# Patient Record
Sex: FEMALE | URBAN - METROPOLITAN AREA
[De-identification: names, ages, dates, MRNs, and addresses within clinical notes are randomized per-mention and may not be internally consistent; named-entity substitution may affect disease eponyms.]

---

## 2020-09-15 ENCOUNTER — APPOINTMENT (RX ONLY)
Dept: URBAN - METROPOLITAN AREA CLINIC 318 | Facility: CLINIC | Age: 35
Setting detail: DERMATOLOGY
End: 2020-09-15

## 2020-09-15 DIAGNOSIS — Z85.828 PERSONAL HISTORY OF OTHER MALIGNANT NEOPLASM OF SKIN: ICD-10-CM | Status: RESOLVED

## 2020-09-15 DIAGNOSIS — D22 MELANOCYTIC NEVI: ICD-10-CM

## 2020-09-15 PROBLEM — D22.39 MELANOCYTIC NEVI OF OTHER PARTS OF FACE: Status: ACTIVE | Noted: 2020-09-15

## 2020-09-15 PROBLEM — D22.5 MELANOCYTIC NEVI OF TRUNK: Status: ACTIVE | Noted: 2020-09-15

## 2020-09-15 PROBLEM — D22.72 MELANOCYTIC NEVI OF LEFT LOWER LIMB, INCLUDING HIP: Status: ACTIVE | Noted: 2020-09-15

## 2020-09-15 PROBLEM — D22.61 MELANOCYTIC NEVI OF RIGHT UPPER LIMB, INCLUDING SHOULDER: Status: ACTIVE | Noted: 2020-09-15

## 2020-09-15 PROBLEM — D22.71 MELANOCYTIC NEVI OF RIGHT LOWER LIMB, INCLUDING HIP: Status: ACTIVE | Noted: 2020-09-15

## 2020-09-15 PROBLEM — D22.62 MELANOCYTIC NEVI OF LEFT UPPER LIMB, INCLUDING SHOULDER: Status: ACTIVE | Noted: 2020-09-15

## 2020-09-15 PROCEDURE — ? COUNSELING

## 2020-09-15 PROCEDURE — ? ADDITIONAL NOTES

## 2020-09-15 PROCEDURE — 99203 OFFICE O/P NEW LOW 30 MIN: CPT

## 2020-09-15 ASSESSMENT — LOCATION SIMPLE DESCRIPTION DERM
LOCATION SIMPLE: LEFT PRETIBIAL REGION
LOCATION SIMPLE: RIGHT CHEEK
LOCATION SIMPLE: RIGHT PRETIBIAL REGION
LOCATION SIMPLE: LEFT UPPER ARM
LOCATION SIMPLE: RIGHT FOREHEAD
LOCATION SIMPLE: RIGHT CALF
LOCATION SIMPLE: CHEST
LOCATION SIMPLE: RIGHT UPPER ARM
LOCATION SIMPLE: RIGHT UPPER BACK
LOCATION SIMPLE: LEFT CALF

## 2020-09-15 ASSESSMENT — LOCATION DETAILED DESCRIPTION DERM
LOCATION DETAILED: RIGHT CENTRAL MALAR CHEEK
LOCATION DETAILED: LEFT DISTAL CALF
LOCATION DETAILED: RIGHT ANTERIOR PROXIMAL UPPER ARM
LOCATION DETAILED: LEFT PROXIMAL PRETIBIAL REGION
LOCATION DETAILED: RIGHT INFERIOR FOREHEAD
LOCATION DETAILED: LEFT ANTERIOR PROXIMAL UPPER ARM
LOCATION DETAILED: RIGHT PROXIMAL CALF
LOCATION DETAILED: RIGHT MID-UPPER BACK
LOCATION DETAILED: RIGHT PROXIMAL PRETIBIAL REGION
LOCATION DETAILED: UPPER STERNUM

## 2020-09-15 ASSESSMENT — LOCATION ZONE DERM
LOCATION ZONE: FACE
LOCATION ZONE: LEG
LOCATION ZONE: ARM
LOCATION ZONE: TRUNK

## 2024-11-06 ENCOUNTER — APPOINTMENT (RX ONLY)
Dept: URBAN - METROPOLITAN AREA CLINIC 166 | Facility: CLINIC | Age: 39
Setting detail: DERMATOLOGY
End: 2024-11-06

## 2024-11-06 DIAGNOSIS — L71.8 OTHER ROSACEA: ICD-10-CM

## 2024-11-06 DIAGNOSIS — L65.0 TELOGEN EFFLUVIUM: ICD-10-CM

## 2024-11-06 DIAGNOSIS — L21.8 OTHER SEBORRHEIC DERMATITIS: ICD-10-CM

## 2024-11-06 PROBLEM — L65.9 NONSCARRING HAIR LOSS, UNSPECIFIED: Status: ACTIVE | Noted: 2024-11-06

## 2024-11-06 PROCEDURE — ? COUNSELING

## 2024-11-06 PROCEDURE — ? DIAGNOSIS COMMENT

## 2024-11-06 PROCEDURE — ? TREATMENT REGIMEN

## 2024-11-06 PROCEDURE — ? COSMETIC CONSULTATION - PULSED-DYE LASER

## 2024-11-06 PROCEDURE — 99203 OFFICE O/P NEW LOW 30 MIN: CPT

## 2024-11-06 PROCEDURE — ? PRESCRIPTION

## 2024-11-06 RX ORDER — KETOCONAZOLE 20 MG/ML
SHAMPOO, SUSPENSION TOPICAL
Qty: 120 | Refills: 11 | Status: ERX | COMMUNITY
Start: 2024-11-06

## 2024-11-06 RX ORDER — PHARMACY COMPOUNDING ACCESSORY
G EACH MISCELLANEOUS BID
Qty: 30 | Refills: 6 | Status: ERX | COMMUNITY
Start: 2024-11-06

## 2024-11-06 RX ORDER — DOXYCYCLINE 100 MG/1
CAPSULE ORAL ONCE A DAY
Qty: 60 | Refills: 3 | Status: ERX | COMMUNITY
Start: 2024-11-06

## 2024-11-06 RX ADMIN — DOXYCYCLINE: 100 CAPSULE ORAL at 00:00

## 2024-11-06 RX ADMIN — Medication G: at 00:00

## 2024-11-06 RX ADMIN — KETOCONAZOLE: 20 SHAMPOO, SUSPENSION TOPICAL at 00:00

## 2024-11-06 ASSESSMENT — LOCATION SIMPLE DESCRIPTION DERM
LOCATION SIMPLE: SCALP
LOCATION SIMPLE: LEFT CHEEK
LOCATION SIMPLE: RIGHT CHEEK

## 2024-11-06 ASSESSMENT — LOCATION DETAILED DESCRIPTION DERM
LOCATION DETAILED: LEFT SUPERIOR PARIETAL SCALP
LOCATION DETAILED: RIGHT INFERIOR CENTRAL MALAR CHEEK
LOCATION DETAILED: LEFT CENTRAL MALAR CHEEK

## 2024-11-06 ASSESSMENT — LOCATION ZONE DERM
LOCATION ZONE: SCALP
LOCATION ZONE: FACE

## 2024-11-06 NOTE — HPI: RASH (ROSACEA)
How Severe Is Your Rosacea?: mild
Is This A New Presentation, Or A Follow-Up?: Rosacea
Additional History: Patient reports intermittent flares every few months, treated with intermittent doxycycline.

## 2024-11-06 NOTE — PROCEDURE: TREATMENT REGIMEN
Initiate Treatment: Mystro twice daily, followed by compounded triple cream twice daily to affected areas on face\\n\\ndoxycycline monohydrate 100 mg capsule. Take 1 tablet twice daily as needed for acne/rosacea for 1-2 weeks for flares. Take with food and water.
Detail Level: Zone

## 2024-11-06 NOTE — PROCEDURE: DIAGNOSIS COMMENT
Render Risk Assessment In Note?: no
Detail Level: Simple
Comment: Diffuse hair thinning most consistent with TE. Start neutrafol and topical minoxidil. Could consider labs in the future if persisting.

## 2024-11-06 NOTE — HPI: HAIR LOSS
How Did The Hair Loss Occur?: gradual in onset
How Severe Is Your Hair Loss?: mild
Additional History: Patient stopped breastfeeding in June

## 2025-03-05 ENCOUNTER — APPOINTMENT (OUTPATIENT)
Dept: URBAN - METROPOLITAN AREA CLINIC 166 | Facility: CLINIC | Age: 40
Setting detail: DERMATOLOGY
End: 2025-03-05

## 2025-03-05 ENCOUNTER — RX ONLY (RX ONLY)
Age: 40
End: 2025-03-05

## 2025-03-05 DIAGNOSIS — D22 MELANOCYTIC NEVI: ICD-10-CM

## 2025-03-05 DIAGNOSIS — L71.8 OTHER ROSACEA: ICD-10-CM

## 2025-03-05 DIAGNOSIS — L21.8 OTHER SEBORRHEIC DERMATITIS: ICD-10-CM

## 2025-03-05 DIAGNOSIS — L65.0 TELOGEN EFFLUVIUM: ICD-10-CM

## 2025-03-05 PROBLEM — D22.71 MELANOCYTIC NEVI OF RIGHT LOWER LIMB, INCLUDING HIP: Status: ACTIVE | Noted: 2025-03-05

## 2025-03-05 PROBLEM — L65.9 NONSCARRING HAIR LOSS, UNSPECIFIED: Status: ACTIVE | Noted: 2025-03-05

## 2025-03-05 PROCEDURE — 99214 OFFICE O/P EST MOD 30 MIN: CPT

## 2025-03-05 PROCEDURE — ? PRESCRIPTION MEDICATION MANAGEMENT

## 2025-03-05 PROCEDURE — ? DIAGNOSIS COMMENT

## 2025-03-05 PROCEDURE — ? COUNSELING

## 2025-03-05 PROCEDURE — ? TREATMENT REGIMEN

## 2025-03-05 RX ORDER — PHARMACY COMPOUNDING ACCESSORY
G EACH MISCELLANEOUS BID
Qty: 30 | Refills: 6 | Status: ERX

## 2025-03-05 ASSESSMENT — LOCATION DETAILED DESCRIPTION DERM
LOCATION DETAILED: LEFT SUPERIOR PARIETAL SCALP
LOCATION DETAILED: RIGHT HEEL
LOCATION DETAILED: RIGHT INFERIOR CENTRAL MALAR CHEEK
LOCATION DETAILED: LEFT CENTRAL MALAR CHEEK

## 2025-03-05 ASSESSMENT — LOCATION SIMPLE DESCRIPTION DERM
LOCATION SIMPLE: SCALP
LOCATION SIMPLE: RIGHT HEEL
LOCATION SIMPLE: LEFT CHEEK
LOCATION SIMPLE: RIGHT CHEEK

## 2025-03-05 ASSESSMENT — LOCATION ZONE DERM
LOCATION ZONE: FACE
LOCATION ZONE: SCALP
LOCATION ZONE: FEET

## 2025-03-05 NOTE — PROCEDURE: DIAGNOSIS COMMENT
Comment: Patient noticed numbness of the tongue after starting nutrafol, counseled patient to stop Nutrafol. Will refer to oral pathologist if sensation does not improve.
Detail Level: Simple
Render Risk Assessment In Note?: no
Comment: Rediscussed a series of 3-4 PDL treatments for background redness associated with rosacea. $400/treatment. Recommended maintenance treatments 1-2x yearly.

## 2025-03-05 NOTE — PROCEDURE: TREATMENT REGIMEN
Initiate Treatment: Patient did not  compounded triple cream, will resend the prescription and patient will begin.
Detail Level: Zone
Continue Regimen: Mystro twice daily, followed by compounded triple cream twice daily to affected areas on face\\n\\ndoxycycline monohydrate 100 mg capsule. Take 1 tablet twice daily as needed for acne/rosacea for 1-2 weeks for flares. Take with food and water.
Continue Regimen: Ketoconazole shampoo during every hair wash as needed for itching and scaling
Initiate Treatment: Topical minoxidil 5% daily

## 2025-07-09 ENCOUNTER — APPOINTMENT (OUTPATIENT)
Dept: URBAN - METROPOLITAN AREA CLINIC 166 | Facility: CLINIC | Age: 40
Setting detail: DERMATOLOGY
End: 2025-07-09

## 2025-07-09 DIAGNOSIS — L65.0 TELOGEN EFFLUVIUM: ICD-10-CM

## 2025-07-09 DIAGNOSIS — L71.8 OTHER ROSACEA: ICD-10-CM

## 2025-07-09 PROBLEM — L65.9 NONSCARRING HAIR LOSS, UNSPECIFIED: Status: ACTIVE | Noted: 2025-07-09

## 2025-07-09 PROCEDURE — ? TREATMENT REGIMEN

## 2025-07-09 PROCEDURE — ? DIAGNOSIS COMMENT

## 2025-07-09 PROCEDURE — ? COUNSELING

## 2025-07-09 PROCEDURE — ? PRESCRIPTION MEDICATION MANAGEMENT

## 2025-07-09 PROCEDURE — ? PRESCRIPTION

## 2025-07-09 RX ORDER — OXYMETAZOLINE HYDROCHLORIDE 30 G/1
CREAM TOPICAL
Qty: 30 | Refills: 4 | Status: ERX | COMMUNITY
Start: 2025-07-09

## 2025-07-09 RX ADMIN — OXYMETAZOLINE HYDROCHLORIDE: 30 CREAM TOPICAL at 00:00

## 2025-07-09 ASSESSMENT — LOCATION DETAILED DESCRIPTION DERM
LOCATION DETAILED: LEFT CENTRAL MALAR CHEEK
LOCATION DETAILED: RIGHT INFERIOR CENTRAL MALAR CHEEK
LOCATION DETAILED: LEFT SUPERIOR PARIETAL SCALP

## 2025-07-09 ASSESSMENT — LOCATION SIMPLE DESCRIPTION DERM
LOCATION SIMPLE: SCALP
LOCATION SIMPLE: LEFT CHEEK
LOCATION SIMPLE: RIGHT CHEEK

## 2025-07-09 ASSESSMENT — LOCATION ZONE DERM
LOCATION ZONE: FACE
LOCATION ZONE: SCALP

## 2025-07-09 NOTE — PROCEDURE: TREATMENT REGIMEN
Initiate Treatment: Rhofade 1 % topical cream. Apply to nose and cheeks once daily in the morning.
Detail Level: Zone
Continue Regimen: Compounded triple cream nightly to affected areas on face. \\nDoxycycline monohydrate 100 mg capsule. Take 1 tablet twice daily as needed for acne/rosacea for 1-2 weeks for flares. Take with food and water.
Initiate Treatment: Topical minoxidil 5% daily

## 2025-07-09 NOTE — PROCEDURE: DIAGNOSIS COMMENT
Comment: HISTORY: Patient noticed numbness of the tongue after starting nutrafol, counseled patient to stop Nutrafol. Will refer to oral pathologist if sensation does not improve (Patient reports this symptom has resolved)
Detail Level: Simple
Render Risk Assessment In Note?: no
Comment: Overall better but still with background erythema. Start Rhofade. Can always consider PDL in the future.

## 2025-07-09 NOTE — PROCEDURE: PRESCRIPTION MEDICATION MANAGEMENT
Initiate Treatment: Rhofade 1 % topical cream. Apply to nose and cheeks once daily in the morning.
Continue Regimen: Continue documented regimen as above.
Detail Level: Zone
Render In Strict Bullet Format?: No

## 2025-07-10 RX ORDER — OXYMETAZOLINE HYDROCHLORIDE 30 G/1
CREAM TOPICAL
Qty: 30 | Refills: 4 | Status: ERX

## 2025-08-13 ENCOUNTER — APPOINTMENT (OUTPATIENT)
Dept: URBAN - METROPOLITAN AREA CLINIC 166 | Facility: CLINIC | Age: 40
Setting detail: DERMATOLOGY
End: 2025-08-13

## 2025-08-13 ENCOUNTER — RX ONLY (RX ONLY)
Age: 40
End: 2025-08-13

## 2025-08-13 DIAGNOSIS — D18.0 HEMANGIOMA: ICD-10-CM

## 2025-08-13 DIAGNOSIS — L82.1 OTHER SEBORRHEIC KERATOSIS: ICD-10-CM

## 2025-08-13 DIAGNOSIS — D22 MELANOCYTIC NEVI: ICD-10-CM

## 2025-08-13 DIAGNOSIS — Z85.828 PERSONAL HISTORY OF OTHER MALIGNANT NEOPLASM OF SKIN: ICD-10-CM

## 2025-08-13 DIAGNOSIS — L81.4 OTHER MELANIN HYPERPIGMENTATION: ICD-10-CM

## 2025-08-13 DIAGNOSIS — Z71.89 OTHER SPECIFIED COUNSELING: ICD-10-CM

## 2025-08-13 PROBLEM — D22.71 MELANOCYTIC NEVI OF RIGHT LOWER LIMB, INCLUDING HIP: Status: ACTIVE | Noted: 2025-08-13

## 2025-08-13 PROBLEM — D22.5 MELANOCYTIC NEVI OF TRUNK: Status: ACTIVE | Noted: 2025-08-13

## 2025-08-13 PROBLEM — D18.01 HEMANGIOMA OF SKIN AND SUBCUTANEOUS TISSUE: Status: ACTIVE | Noted: 2025-08-13

## 2025-08-13 PROCEDURE — ? COUNSELING

## 2025-08-13 PROCEDURE — ? OBSERVATION

## 2025-08-13 RX ORDER — OXYMETAZOLINE HYDROCHLORIDE 30 G/1
CREAM TOPICAL
Qty: 30 | Refills: 4 | Status: ERX

## 2025-08-13 ASSESSMENT — LOCATION DETAILED DESCRIPTION DERM
LOCATION DETAILED: RIGHT ANTERIOR SHOULDER
LOCATION DETAILED: RIGHT LATERAL SUPERIOR CHEST
LOCATION DETAILED: RIGHT MEDIAL SUPERIOR CHEST
LOCATION DETAILED: RIGHT SUPERIOR UPPER BACK
LOCATION DETAILED: LEFT SUPERIOR LATERAL LOWER BACK
LOCATION DETAILED: RIGHT HEEL
LOCATION DETAILED: LEFT MEDIAL BREAST 9-10:00 REGION

## 2025-08-13 ASSESSMENT — LOCATION SIMPLE DESCRIPTION DERM
LOCATION SIMPLE: LEFT BREAST
LOCATION SIMPLE: RIGHT SHOULDER
LOCATION SIMPLE: RIGHT UPPER BACK
LOCATION SIMPLE: CHEST
LOCATION SIMPLE: LEFT LOWER BACK
LOCATION SIMPLE: RIGHT HEEL

## 2025-08-13 ASSESSMENT — LOCATION ZONE DERM
LOCATION ZONE: TRUNK
LOCATION ZONE: FEET
LOCATION ZONE: ARM

## 2025-08-14 ENCOUNTER — RX ONLY (RX ONLY)
Age: 40
End: 2025-08-14

## 2025-08-14 RX ORDER — OXYMETAZOLINE HYDROCHLORIDE 30 G/1
CREAM TOPICAL
Qty: 30 | Refills: 4 | Status: ERX